# Patient Record
(demographics unavailable — no encounter records)

---
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
## 2025-03-07 NOTE — HISTORY OF PRESENT ILLNESS
[FreeTextEntry1] : Ms. Cardoza is presenting for concerns for PCOS.   25 year old female with PMH of depression, anxiety, BMI 28.    C/o hair on arms  Possible cysts on ovaries  Excess sweating  Struggling with weight loss. Was on semaglutide.  Heaviest weighed 237 lbs --> 147lbs, gained back to 164lbs.  Works out the same amount pre and post semaglutide.  3-4x works out with cardio and minimal weights.  On OCP. Occur monthly.  C/o acne in teens, cleared up since OCP.  Diet: Minimal meat, carb heavy.

## 2025-03-07 NOTE — PHYSICAL EXAM
[Alert] : alert [Well Nourished] : well nourished [EOMI] : extra ocular movement intact [Normal Hearing] : hearing was normal [Normal Rate] : heart rate was normal [No Stigmata of Cushings Syndrome] : no stigmata of Cushings Syndrome [Normal Gait] : normal gait [Hirsutism] : no hirsutism [No Tremors] : no tremors [Oriented x3] : oriented to person, place, and time [Normal Affect] : the affect was normal [Normal Mood] : the mood was normal

## 2025-03-07 NOTE — ASSESSMENT
[FreeTextEntry1] : 25 year old female with PMH of depression, anxiety, BMI 28 is presenting for concerns for PCOS.    Unlikely that patient has PCOS given symptoms and normal periods (prior to OCP use).  We discussed we can check hormone levels but while on OCP they are not diagnostic.  Check B12 given deficiency.   Patient verbalized understanding of the above. All questions were answered to patient's satisfaction. Dispo: Patient will follow up as needed.